# Patient Record
Sex: MALE | Race: ASIAN | NOT HISPANIC OR LATINO | URBAN - METROPOLITAN AREA
[De-identification: names, ages, dates, MRNs, and addresses within clinical notes are randomized per-mention and may not be internally consistent; named-entity substitution may affect disease eponyms.]

---

## 2023-09-09 ENCOUNTER — EMERGENCY (EMERGENCY)
Facility: HOSPITAL | Age: 38
LOS: 1 days | Discharge: HOME | End: 2023-09-09
Admitting: EMERGENCY MEDICINE
Payer: SELF-PAY

## 2023-09-09 VITALS
SYSTOLIC BLOOD PRESSURE: 106 MMHG | HEART RATE: 77 BPM | TEMPERATURE: 98 F | HEIGHT: 67 IN | RESPIRATION RATE: 20 BRPM | DIASTOLIC BLOOD PRESSURE: 72 MMHG | WEIGHT: 179.9 LBS | OXYGEN SATURATION: 95 %

## 2023-09-09 DIAGNOSIS — H65.91 UNSPECIFIED NONSUPPURATIVE OTITIS MEDIA, RIGHT EAR: ICD-10-CM

## 2023-09-09 DIAGNOSIS — H92.01 OTALGIA, RIGHT EAR: ICD-10-CM

## 2023-09-09 PROCEDURE — 99282 EMERGENCY DEPT VISIT SF MDM: CPT

## 2023-09-09 PROCEDURE — 99283 EMERGENCY DEPT VISIT LOW MDM: CPT

## 2023-09-09 RX ORDER — FLUTICASONE PROPIONATE 50 MCG
1 SPRAY, SUSPENSION NASAL
Qty: 1 | Refills: 0
Start: 2023-09-09

## 2023-09-09 NOTE — ED PROVIDER NOTE - CLINICAL SUMMARY MEDICAL DECISION MAKING FREE TEXT BOX
38 M pmh p/w R ear pain x 3 days.  no f/c, uri sxs, HA, dizziness.  swam 1 month ago, nothing recent.  has not taken anything for sxs.  on exam vss, well appearing, HEENT: ncat, eomi, R TM w/ effusion and + light reflex, no bulging or erythema, L TM wnl, b/l canals no edema, no dc, mmm.  c/f otitis media w/ effusion, ? allergy related, does not appear infected- no indication for abx.  recommend supportive care and f/u outpt.  discussed strict return parameters

## 2023-09-09 NOTE — ED ADULT NURSE NOTE - NS ED PATIENT SAFETY CONCERN
Rapid strep test negative  Patient placed on fluticasone nasal spray and Tessalon Perles for cough  Advised patient to increase fluid intake  Gargle with salt water  Continue with lozenges  Upper Respiratory Infection   WHAT YOU NEED TO KNOW:   An upper respiratory infection is also called the common cold  It is an infection that can affect your nose, throat, ears, and sinuses  For healthy people, the common cold is usually not serious and does not need special treatment  Cold symptoms are usually worst for the first 3 to 5 days  Most people get better in 7 to 14 days  You may continue to cough for 2 to 3 weeks  Colds are caused by viruses and do not get better with antibiotics  DISCHARGE INSTRUCTIONS:   Return to the emergency department if:   · You have chest pain or trouble breathing  Contact your healthcare provider if:   · You have a fever over 102ºF (39°C)  · Your sore throat gets worse or you see white or yellow spots in your throat  · Your symptoms get worse after 3 to 5 days or your cold is not better in 14 days  · You have a rash anywhere on your skin  · You have large, tender lumps in your neck  · You have thick, green or yellow drainage from your nose  · You cough up thick yellow, green, or bloody mucus  · You have vomiting for more than 24 hours and cannot keep fluids down  · You have a bad earache  · You have questions or concerns about your condition or care  Medicines: You may need any of the following:  · Decongestants  help reduce nasal congestion and help you breathe more easily  If you take decongestant pills, they may make you feel restless or cause problems with your sleep  Do not use decongestant sprays for more than a few days  · Cough suppressants  help reduce coughing  Ask your healthcare provider which type of cough medicine is best for you  · NSAIDs , such as ibuprofen, help decrease swelling, pain, and fever   NSAIDs can cause stomach bleeding or kidney problems in certain people  If you take blood thinner medicine, always ask your healthcare provider if NSAIDs are safe for you  Always read the medicine label and follow directions  · Acetaminophen  decreases pain and fever  It is available without a doctor's order  Ask how much to take and how often to take it  Follow directions  Read the labels of all other medicines you are using to see if they also contain acetaminophen, or ask your doctor or pharmacist  Acetaminophen can cause liver damage if not taken correctly  Do not use more than 4 grams (4,000 milligrams) total of acetaminophen in one day  · Take your medicine as directed  Contact your healthcare provider if you think your medicine is not helping or if you have side effects  Tell him or her if you are allergic to any medicine  Keep a list of the medicines, vitamins, and herbs you take  Include the amounts, and when and why you take them  Bring the list or the pill bottles to follow-up visits  Carry your medicine list with you in case of an emergency  Follow up with your healthcare provider as directed:  Write down your questions so you remember to ask them during your visits  Self-care:   · Rest as much as possible  Slowly start to do more each day  · Drink more liquids as directed  Liquids will help thin and loosen mucus so you can cough it up  Liquids will also help prevent dehydration  Liquids that help prevent dehydration include water, fruit juice, and broth  Do not drink liquids that contain caffeine  Caffeine can increase your risk for dehydration  Ask your healthcare provider how much liquid to drink each day  · Soothe a sore throat  Gargle with warm salt water  This helps your sore throat feel better  Make salt water by dissolving ¼ teaspoon salt in 1 cup warm water  You may also suck on hard candy or throat lozenges  You may use a sore throat spray  · Use a humidifier or vaporizer    Use a cool mist humidifier or a vaporizer to increase air moisture in your home  This may make it easier for you to breathe and help decrease your cough  · Use saline nasal drops as directed  These help relieve congestion  · Apply petroleum-based jelly around the outside of your nostrils  This can decrease irritation from blowing your nose  · Do not smoke  Nicotine and other chemicals in cigarettes and cigars can make your symptoms worse  They can also cause infections such as bronchitis or pneumonia  Ask your healthcare provider for information if you currently smoke and need help to quit  E-cigarettes or smokeless tobacco still contain nicotine  Talk to your healthcare provider before you use these products  Prevent spreading your cold to others:   · Try to stay away from other people during the first 2 to 3 days of your cold when it is more easily spread  · Do not share food or drinks  · Do not share hand towels with household members  · Wash your hands often, especially after you blow your nose  Turn away from other people and cover your mouth and nose with a tissue when you sneeze or cough  © 2017 [facility] Information is for End User's use only and may not be sold, redistributed or otherwise used for commercial purposes  All illustrations and images included in CareNotes® are the copyrighted property of [employer] D [employer]  or Clarence Constantino  The above information is an  only  It is not intended as medical advice for individual conditions or treatments  Talk to your doctor, nurse or pharmacist before following any medical regimen to see if it is safe and effective for you  No

## 2023-09-09 NOTE — ED PROVIDER NOTE - PHYSICAL EXAMINATION
Gen: well appearing, no acute distress  Skin: warm/dry, no rash noted  HEENT: ncat, eomi, R TM w/ effusion and + light reflex, no bulging or erythema, L TM wnl, b/l canals no edema, no dc, mmm  Resp: breathing comfortably, speaking in full sentences, no dyspnea  Neuro: alert/oriented, ambulatory

## 2023-09-09 NOTE — ED ADULT NURSE NOTE - NSFALLUNIVINTERV_ED_ALL_ED
Bed/Stretcher in lowest position, wheels locked, appropriate side rails in place/Call bell, personal items and telephone in reach/Instruct patient to call for assistance before getting out of bed/chair/stretcher/Non-slip footwear applied when patient is off stretcher/[place] to call system/Physically safe environment - no spills, clutter or unnecessary equipment/Purposeful proactive rounding/Room/bathroom lighting operational, light cord in reach

## 2023-09-09 NOTE — ED PROVIDER NOTE - NSFOLLOWUPINSTRUCTIONS_ED_ALL_ED_FT
You can try allergy medication such as allegra or zyrtec    Use flonase as prescribed    Please call to arrange follow up with primary care doctor within one week    Otitis Media With Effusion, Adult  An ear, with close-ups of a normal middle ear and an inflamed middle ear filled with fluid.  Otitis media with effusion (OME) is inflammation and fluid (effusion) in the middle ear without having an ear infection. The middle ear is the space behind the eardrum. The middle ear is connected to the back of the throat by a narrow tube (eustachian tube). Normally the eustachian tube drains fluid out of the middle ear. A swollen eustachian tube can become blocked and cause fluid to collect in the middle ear.    OME often goes away without treatment. Sometimes OME can lead to hearing problems and recurrent acute ear infections (acute otitis media). These conditions may require treatment.    What are the causes?  OME is caused by a blocked eustachian tube. This can result from:  Allergies.  Upper respiratory infections.  Enlarged adenoids. The adenoids are areas of soft tissue located high in the back of the throat, behind the nose and the roof of the mouth. They are part of the body's natural defense system (immune system).  Rapid changes in pressure, like when an airplane is descending or during scuba diving.  In some cases, the cause of this condition is not known.    What are the signs or symptoms?  Common symptoms of this condition include:  A feeling of fullness in your ear.  Decreased hearing in the affected ear.  Fluid draining into the ear canal.  Pain in the ear.  In some cases, there are no symptoms.    How is this diagnosed?  A health care provider checks a person's ear using an otoscope. A close-up of the ear and otoscope is also shown.   A health care provider can diagnose OME based on signs and symptoms of the condition. Your provider will also do a physical exam to check for fluid behind the eardrum. During the exam, your health care provider will use an instrument called an otoscope to look in your ear.    Your health care provider may do other tests, such as:  A hearing test.  A tympanogram. This is a test that shows how well the eardrum moves in response to air pressure in the ear canal. It provides a graph for your health care provider to review.  A pneumatic otoscopy. This is a test to check how your eardrum moves in response to changes in pressure. It is done by squeezing a small amount of air into the ear.  How is this treated?  Treatment for OME depends on the cause of the condition and the severity of symptoms. The first step is often waiting to see if the fluid drains on its own in a few weeks. Home care treatment may include:  Over-the-counter pain relievers.  A warm, moist cloth placed over the ear.  Severe cases may require a procedure to insert tubes in the ears (tympanostomy tubes) to drain the fluid.    Follow these instructions at home:  Take over-the-counter and prescription medicines only as told by your health care provider.  Keep all follow-up visits.  Contact a health care provider if:  You have pain that gets worse.  Hearing in your affected ear gets worse.  You have fluid draining from your ear canal.  You have dizziness.  You develop a fever.  Get help right away if:  You develop a severe headache.  You completely lose hearing in the affected ear.  You have bleeding from your ear canal.  You have sudden and severe pain in your ear.  These symptoms may represent a serious problem that is an emergency. Do not wait to see if the symptoms will go away. Get medical help right away. Call your local emergency services (911 in the [place] Do not drive yourself to the hospital.    Summary  Otitis media with effusion (OME) is inflammation and fluid (effusion) in the middle ear without having an ear infection.  A swollen eustachian tube can become blocked and cause fluid to collect in the middle ear.  Treatment for OME depends on the cause of the condition and the severity of symptoms.  Many times, treatment is not needed because the fluid drains on its own in a few weeks.  Sometimes OME can lead to hearing problems and recurrent acute ear infections (acute otitis media), which may require treatment.  This information is not intended to replace advice given to you by your health care provider. Make sure you discuss any questions you have with your health care provider.

## 2023-09-09 NOTE — ED PROVIDER NOTE - NSTIMEPROVIDERCAREINITIATE_GEN_ER
Last office visit with Dr. Squires Mom 5/2/18  Last refilled on 3/26/18 Lexapro #[id number]
09-Sep-2023 16:33

## 2023-09-09 NOTE — ED PROVIDER NOTE - PATIENT PORTAL LINK FT
You can access the FollowMyHealth Patient Portal offered by [facility] by registering at the following website: [email or web address]. By joining [employer]’s FollowMyHealth portal, you will also be able to view your health information using other applications (apps) compatible with our system.

## 2023-09-09 NOTE — ED PROVIDER NOTE - OBJECTIVE STATEMENT
38 M pmh p/w R ear pain x 3 days.  pt reports intermittent dull aching pain in R ear, nonradiating.  reports worse w/ certain movements.  has not tried anything for sxs.  [place], no recent uri.  denies f/c, ear discharge, uri sxs, cough, congestion, sore throat, dizziness, tinnitus, nv, barotrauma, trauma

## 2023-09-09 NOTE — ED ADULT NURSE NOTE - OBJECTIVE STATEMENT
[age] yo M presents to ED co R ear pain x4-5 days. states he is a  and frequently has the AC blowing on him and noticed his ear becoming irritated from the constant flow of air. Denies associated sx. No recent travel. Denies f/c, ear drainage, rhinorrhea, nasal congestion, sore throat, cough, CP, SOB, HA.
